# Patient Record
Sex: FEMALE | Race: ASIAN | NOT HISPANIC OR LATINO | Employment: STUDENT | URBAN - METROPOLITAN AREA
[De-identification: names, ages, dates, MRNs, and addresses within clinical notes are randomized per-mention and may not be internally consistent; named-entity substitution may affect disease eponyms.]

---
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
## 2022-05-12 NOTE — PROGRESS NOTES
"Female Preventive Health Visit    SUBJECTIVE:    This 22 year old female presents for a routine preventive physical exam.    1) FHx father with type 2 DM. Mother with pre DM.     Patient's medications, allergies, past medical, surgical and family histories were reviewed and updated as appropriate.    OB/Gyn History:      Pap Smear: Wishes to postpone until after sexual debut. No prior sexual activity. Belief in sex after marriage.   Current Contraceptive Method:  abstinence    HIV and chlamydia : not yet had sexual debut. Shared decision to postpone.    Health Maintenance:  Health maintenance alerts were reviewed and updated as appropriate.    Answers for HPI/ROS submitted by the patient on 5/16/2022  Frequency of exercise:: 1 day/week  Getting at least 3 servings of Calcium per day:: Yes  Diet:: Regular (no restrictions)  Taking medications regularly:: Not Applicable  Medication side effects:: Not applicable  Bi-annual eye exam:: Yes  Dental care twice a year:: Yes    Sleep apnea or symptoms of sleep apnea:: None  Additional concerns today:: No  Duration of exercise:: 15-30 minutes      Abuse: Current or Past(Physical, Sexual or Emotional)- No  Do you feel safe in your environment? Yes    Have you ever done Advance Care Planning? (For example, a Health Directive, POLST, or a discussion with a medical provider or your loved ones about your wishes): No, advance care planning information given to patient to review.  Patient declined advance care planning discussion at this time.    OBJECTIVE:  Vitals:    05/17/22 0931   BP: 102/68   Pulse: 80   Resp: 16   SpO2: 99%   Weight: 49.4 kg (108 lb 12.8 oz)   Height: 1.657 m (5' 5.25\")    Body mass index is 17.97 kg/m .  General: no acute distress, cooperative with exam.  HEENT:  PERRLA. Bilateral TM's, external canals, oropharynx normal.    Neck:  Supple, no lymphadenopathy or thyromegaly   Lungs: clear to auscultation bilaterally, normal respiratory effort.  Heart:  RRR " without murmurs, rubs or gallops.  Normal S1 and S2.  Abdomen: normal bowel sounds, nontender, no palpable organomegaly.  Skin:  No lesions.  No rashes.  Extremities: warm, perfused, no swelling or edema.  Neuro:  CN II-XII intact, motor & sensory function all intact.    Psych: mental status normal, mood and affect appropriate.    No flowsheet data found.    ASSESSMENT / PLAN:  This 22 year old female presents for a routine preventive physical exam. Preventive health topics discussed including adequate exercise and healthy diet. Return to clinic in one year for preventative exam or sooner with any other concerns.  Other issues discussed as noted below.    Routine general medical examination at a health care facility  -     VENOUS COLLECTION  -     Lipid Profile ([facility])    FH: diabetes mellitus  -     Glucose ([facility])

## 2022-05-17 ENCOUNTER — OFFICE VISIT (OUTPATIENT)
Dept: FAMILY MEDICINE | Facility: CLINIC | Age: 22
End: 2022-05-17

## 2022-05-17 VITALS
HEART RATE: 80 BPM | SYSTOLIC BLOOD PRESSURE: 102 MMHG | DIASTOLIC BLOOD PRESSURE: 68 MMHG | RESPIRATION RATE: 16 BRPM | BODY MASS INDEX: 18.13 KG/M2 | OXYGEN SATURATION: 99 % | WEIGHT: 108.8 LBS | HEIGHT: 65 IN

## 2022-05-17 DIAGNOSIS — Z83.3 FH: DIABETES MELLITUS: ICD-10-CM

## 2022-05-17 DIAGNOSIS — Z00.00 ROUTINE GENERAL MEDICAL EXAMINATION AT A HEALTH CARE FACILITY: Primary | ICD-10-CM

## 2022-05-17 PROBLEM — F43.22 ADJUSTMENT DISORDER WITH ANXIETY: Status: ACTIVE | Noted: 2017-12-05

## 2022-05-17 LAB
CHOL/HDL RATIO (RMG): 2.5 MG/DL (ref 0–4.5)
CHOLESTEROL: 152 MG/DL (ref 100–199)
GLUCOSE (RMG): 82 MG/DL (ref 65–99)
HDL (RMG): 62 MG/DL (ref 40–?)
LDL CALCULATED (RMG): 77 MG/DL (ref 0–130)
TRIGLYCERIDES (RMG): 65 MG/DL (ref 0–149)

## 2022-05-17 PROCEDURE — 82947 ASSAY GLUCOSE BLOOD QUANT: CPT | Performed by: FAMILY MEDICINE

## 2022-05-17 PROCEDURE — 36415 COLL VENOUS BLD VENIPUNCTURE: CPT | Performed by: FAMILY MEDICINE

## 2022-05-17 PROCEDURE — 80061 LIPID PANEL: CPT | Mod: QW | Performed by: FAMILY MEDICINE

## 2022-05-17 PROCEDURE — 99385 PREV VISIT NEW AGE 18-39: CPT | Performed by: FAMILY MEDICINE

## 2022-09-17 ENCOUNTER — HEALTH MAINTENANCE LETTER (OUTPATIENT)
Age: 22
End: 2022-09-17

## 2023-07-29 ENCOUNTER — HEALTH MAINTENANCE LETTER (OUTPATIENT)
Age: 23
End: 2023-07-29

## 2024-09-21 ENCOUNTER — HEALTH MAINTENANCE LETTER (OUTPATIENT)
Age: 24
End: 2024-09-21